# Patient Record
Sex: MALE | Race: WHITE | HISPANIC OR LATINO
[De-identification: names, ages, dates, MRNs, and addresses within clinical notes are randomized per-mention and may not be internally consistent; named-entity substitution may affect disease eponyms.]

---

## 2023-07-03 ENCOUNTER — APPOINTMENT (OUTPATIENT)
Dept: PODIATRY | Facility: CLINIC | Age: 28
End: 2023-07-03
Payer: COMMERCIAL

## 2023-07-03 DIAGNOSIS — Z87.09 PERSONAL HISTORY OF OTHER DISEASES OF THE RESPIRATORY SYSTEM: ICD-10-CM

## 2023-07-03 PROBLEM — Z00.00 ENCOUNTER FOR PREVENTIVE HEALTH EXAMINATION: Status: ACTIVE | Noted: 2023-07-03

## 2023-07-03 PROCEDURE — 99203 OFFICE O/P NEW LOW 30 MIN: CPT

## 2023-07-03 RX ORDER — EFINACONAZOLE 100 MG/ML
10 SOLUTION TOPICAL
Qty: 1 | Refills: 4 | Status: ACTIVE | COMMUNITY
Start: 2023-07-03 | End: 1900-01-01

## 2023-07-05 RX ORDER — CICLOPIROX 80 MG/ML
8 SOLUTION TOPICAL
Qty: 1 | Refills: 3 | Status: ACTIVE | COMMUNITY
Start: 2023-07-05 | End: 1900-01-01

## 2023-07-14 NOTE — ASSESSMENT
[FreeTextEntry1] : \par Impression: Onychomycosis.\par \par Treatment: Discussed findings and conditions with the patient. Discussed antifungal nail therapy with the patient. discussed topical vs. PO vs laser along with risks and benefits of each. The patient declined laser and oral medication at this time. patient would like to try topical. I recommended Jublia one application daily in the evening, which was ePrescribed to his pharmacy. He was advised to soak the nails daily with one to one mixture of white vinegar and water. Lysol inside all shoes. Bleach white socks and avoid walking barefoot. Patient is to follow-up in approximately 2 months. Any pain, problems or concerns or issues with the medication he is to contact the office.

## 2023-07-14 NOTE — HISTORY OF PRESENT ILLNESS
[Sneakers] : marissa [FreeTextEntry1] : Patient presents today with complaint of bilateral fungal toenails. He states it has been present for several years. He does relate he had a trauma to the right hallux nail when he was in high school. The nail had fallen off due to trauma from a door that had hit the nail. The patient states he tried a topical antifungal cream approximately greater than 5 or 6 months ago. He is unsure when but definitely before Christmastime. Patient was also given Lamisil pulse-dosing for approximately 3 times. He is unsure of the dose and unsure of the duration of treatment. Patient denies any other topical antifungals or any other treatments to the area. Patient denies any other pedal complaints at this time. \par

## 2023-07-14 NOTE — PHYSICAL EXAM
[2+] : left foot dorsalis pedis 2+ [No Joint Swelling] : no joint swelling [] : normal strength/tone [Normal Foot/Ankle] : Both lower extremities were exposed and visualized. Standing exam demonstrates normal foot posture and alignment. Hindfoot exam shows no hindfoot valgus or varus [FreeTextEntry3] : Temperature gradient within normal limits. Positive hair growth bilaterally. Skin turgor is intact bilaterally. [FreeTextEntry1] : Epicritic sensation and light touch are intact bilaterally.

## 2024-02-15 ENCOUNTER — NON-APPOINTMENT (OUTPATIENT)
Age: 29
End: 2024-02-15

## 2024-02-16 ENCOUNTER — APPOINTMENT (OUTPATIENT)
Dept: GASTROENTEROLOGY | Facility: CLINIC | Age: 29
End: 2024-02-16
Payer: COMMERCIAL

## 2024-02-16 VITALS
OXYGEN SATURATION: 99 % | WEIGHT: 163 LBS | HEIGHT: 68 IN | SYSTOLIC BLOOD PRESSURE: 118 MMHG | DIASTOLIC BLOOD PRESSURE: 70 MMHG | TEMPERATURE: 97 F | RESPIRATION RATE: 14 BRPM | BODY MASS INDEX: 24.71 KG/M2 | HEART RATE: 91 BPM

## 2024-02-16 DIAGNOSIS — R10.11 RIGHT UPPER QUADRANT PAIN: ICD-10-CM

## 2024-02-16 DIAGNOSIS — K80.20 CALCULUS OF GALLBLADDER W/OUT CHOLECYSTITIS W/OUT OBSTRUCTION: ICD-10-CM

## 2024-02-16 PROCEDURE — 99204 OFFICE O/P NEW MOD 45 MIN: CPT

## 2024-02-16 NOTE — PHYSICAL EXAM

## 2024-02-16 NOTE — HISTORY OF PRESENT ILLNESS
[FreeTextEntry1] : 27 yo male, known hx of gallstones, with 4 episodes of RUQ pain, moderate to severe. No dark urine. No weight loss, No fever or chills. Sonogram was done at [facility] in [place].

## 2024-02-16 NOTE — ASSESSMENT
[FreeTextEntry1] : 29 yo male, known hx of gallstones, with 4 episodes of RUQ pain, moderate to severe. No dark urine. No weight loss, No fever or chills. Sonogram was done at [facility] in [place].  IMP: 1. hx of gallstones 2. RUQpain  PLAN 1. request and review sonogram of abdomen [facility] 2. request labs from Dr. Verma 3. RTO 2months

## 2024-02-20 ENCOUNTER — NON-APPOINTMENT (OUTPATIENT)
Age: 29
End: 2024-02-20

## 2024-05-22 ENCOUNTER — APPOINTMENT (OUTPATIENT)
Dept: PODIATRY | Facility: CLINIC | Age: 29
End: 2024-05-22
Payer: COMMERCIAL

## 2024-05-22 DIAGNOSIS — M79.676 PAIN IN UNSPECIFIED TOE(S): ICD-10-CM

## 2024-05-22 DIAGNOSIS — B35.1 TINEA UNGUIUM: ICD-10-CM

## 2024-05-22 PROCEDURE — 11720 DEBRIDE NAIL 1-5: CPT

## 2024-05-22 RX ORDER — CICLOPIROX 80 MG/ML
8 SOLUTION TOPICAL
Qty: 1 | Refills: 3 | Status: ACTIVE | COMMUNITY
Start: 2024-05-22 | End: 1900-01-01

## 2024-05-24 PROBLEM — B35.1 ONYCHOMYCOSIS: Status: ACTIVE | Noted: 2023-07-03

## 2024-05-28 PROBLEM — M79.676 TOE PAIN: Status: ACTIVE | Noted: 2024-05-24

## 2024-05-28 NOTE — ASSESSMENT
[FreeTextEntry1] : Impression: Onychomycosis. Pain.  Treatment:  The painful thickened mycotic nails x2 were debrided of excessive thickness and length to appropriate levels of comfort using sterile nippers, and Dremel drill. The procedure was tolerated well without complication.  Continue Ciclopirox. Follow-up as needed.

## 2024-05-28 NOTE — HISTORY OF PRESENT ILLNESS
[FreeTextEntry1] : Patient presents today with painful deformed mycotic nails. They are improving with prescription antifungal.  The patient's history and physical has been reviewed and verified with no changes.